# Patient Record
Sex: FEMALE | Race: WHITE | NOT HISPANIC OR LATINO | Employment: OTHER | URBAN - METROPOLITAN AREA
[De-identification: names, ages, dates, MRNs, and addresses within clinical notes are randomized per-mention and may not be internally consistent; named-entity substitution may affect disease eponyms.]

---

## 2023-10-20 DIAGNOSIS — G47.61 PERIODIC LIMB MOVEMENT DISORDER: ICD-10-CM

## 2023-10-20 DIAGNOSIS — G47.33 OBSTRUCTIVE SLEEP APNEA SYNDROME: Primary | ICD-10-CM

## 2023-11-13 DIAGNOSIS — G25.81 RESTLESS LEG: ICD-10-CM

## 2023-11-13 DIAGNOSIS — G47.61 PERIODIC LIMB MOVEMENT: ICD-10-CM

## 2023-11-13 DIAGNOSIS — G47.33 OSA (OBSTRUCTIVE SLEEP APNEA): Primary | ICD-10-CM

## 2023-11-15 ENCOUNTER — TELEPHONE (OUTPATIENT)
Dept: PRIMARY CARE | Facility: CLINIC | Age: 73
End: 2023-11-15

## 2023-11-15 NOTE — TELEPHONE ENCOUNTER
----- Message from Sandor Potter MD sent at 11/13/2023  7:46 PM EST -----  1--SENT CPAP ORDER TO [facility] - MAYBE ANOTHER DME SITE CLOSER TO HOME WOULD SUIT PT BETTER;  2--MAKE APPT FOR PT DEC 2023 PLZ 6M FLUP APPT.    ----- Message -----  From: Janet Hernandez  Sent: 11/13/2023   3:31 PM EST  To: Sandor Potter MD    PLEASE CALL PATIENT WITH RESULTS, UPSET SHE HASN'T GOTTEN THEM ,       [facility]

## 2024-01-16 DIAGNOSIS — M79.673 PAIN OF FOOT, UNSPECIFIED LATERALITY: Primary | ICD-10-CM

## 2024-01-16 DIAGNOSIS — E53.8 B12 DEFICIENCY: ICD-10-CM

## 2024-01-16 DIAGNOSIS — R73.9 HYPERGLYCEMIA: ICD-10-CM

## 2024-01-16 DIAGNOSIS — E55.9 VITAMIN D DEFICIENCY: ICD-10-CM

## 2024-01-16 DIAGNOSIS — I10 HYPERTENSION, UNSPECIFIED TYPE: ICD-10-CM

## 2024-01-16 DIAGNOSIS — H91.93 BILATERAL HEARING LOSS, UNSPECIFIED HEARING LOSS TYPE: ICD-10-CM

## 2024-01-16 DIAGNOSIS — R06.83 SNORING: ICD-10-CM

## 2024-01-16 DIAGNOSIS — E78.5 HYPERLIPIDEMIA, UNSPECIFIED HYPERLIPIDEMIA TYPE: ICD-10-CM

## 2024-01-16 DIAGNOSIS — M81.0 SENILE OSTEOPOROSIS: ICD-10-CM

## 2024-01-16 RX ORDER — CELECOXIB 200 MG/1
200 CAPSULE ORAL DAILY
Qty: 60 CAPSULE | Refills: 0 | Status: SHIPPED | OUTPATIENT
Start: 2024-01-16 | End: 2024-03-07 | Stop reason: SDUPTHER

## 2024-01-16 RX ORDER — CELECOXIB 200 MG/1
200 CAPSULE ORAL DAILY
COMMUNITY
End: 2024-01-16 | Stop reason: SDUPTHER

## 2024-03-07 ENCOUNTER — OFFICE VISIT (OUTPATIENT)
Dept: PRIMARY CARE | Facility: CLINIC | Age: 74
End: 2024-03-07
Payer: MEDICARE

## 2024-03-07 VITALS
DIASTOLIC BLOOD PRESSURE: 72 MMHG | BODY MASS INDEX: 33.13 KG/M2 | HEART RATE: 84 BPM | TEMPERATURE: 97.2 F | WEIGHT: 180 LBS | HEIGHT: 62 IN | SYSTOLIC BLOOD PRESSURE: 120 MMHG

## 2024-03-07 DIAGNOSIS — G47.33 OSA (OBSTRUCTIVE SLEEP APNEA): ICD-10-CM

## 2024-03-07 DIAGNOSIS — R73.9 HYPERGLYCEMIA: ICD-10-CM

## 2024-03-07 DIAGNOSIS — Z78.0 MENOPAUSE: ICD-10-CM

## 2024-03-07 DIAGNOSIS — M15.9 GENERALIZED OSTEOARTHROSIS, INVOLVING MULTIPLE SITES: ICD-10-CM

## 2024-03-07 DIAGNOSIS — E66.09 CLASS 1 OBESITY DUE TO EXCESS CALORIES WITH SERIOUS COMORBIDITY AND BODY MASS INDEX (BMI) OF 32.0 TO 32.9 IN ADULT: ICD-10-CM

## 2024-03-07 DIAGNOSIS — H91.93 BILATERAL DEAFNESS: ICD-10-CM

## 2024-03-07 DIAGNOSIS — I10 HYPERTENSION, UNSPECIFIED TYPE: Primary | ICD-10-CM

## 2024-03-07 DIAGNOSIS — F43.23 ADJUSTMENT DISORDER WITH MIXED ANXIETY AND DEPRESSED MOOD: ICD-10-CM

## 2024-03-07 DIAGNOSIS — G47.61 PERIODIC LIMB MOVEMENT DISORDER: ICD-10-CM

## 2024-03-07 DIAGNOSIS — Z12.11 COLON CANCER SCREENING: ICD-10-CM

## 2024-03-07 DIAGNOSIS — Z11.59 ENCOUNTER FOR HEPATITIS C SCREENING TEST FOR LOW RISK PATIENT: ICD-10-CM

## 2024-03-07 DIAGNOSIS — M79.673 PAIN OF FOOT, UNSPECIFIED LATERALITY: ICD-10-CM

## 2024-03-07 DIAGNOSIS — G45.9 TRANSIENT ISCHEMIC ATTACK (TIA): ICD-10-CM

## 2024-03-07 DIAGNOSIS — R29.890 LOSS OF HEIGHT: ICD-10-CM

## 2024-03-07 DIAGNOSIS — E78.5 HYPERLIPIDEMIA, UNSPECIFIED HYPERLIPIDEMIA TYPE: ICD-10-CM

## 2024-03-07 DIAGNOSIS — E55.9 VITAMIN D DEFICIENCY: ICD-10-CM

## 2024-03-07 PROCEDURE — 3078F DIAST BP <80 MM HG: CPT | Performed by: FAMILY MEDICINE

## 2024-03-07 PROCEDURE — 1160F RVW MEDS BY RX/DR IN RCRD: CPT | Performed by: FAMILY MEDICINE

## 2024-03-07 PROCEDURE — 3074F SYST BP LT 130 MM HG: CPT | Performed by: FAMILY MEDICINE

## 2024-03-07 PROCEDURE — 1036F TOBACCO NON-USER: CPT | Performed by: FAMILY MEDICINE

## 2024-03-07 PROCEDURE — 99214 OFFICE O/P EST MOD 30 MIN: CPT | Performed by: FAMILY MEDICINE

## 2024-03-07 PROCEDURE — 1159F MED LIST DOCD IN RCRD: CPT | Performed by: FAMILY MEDICINE

## 2024-03-07 RX ORDER — CHOLECALCIFEROL (VITAMIN D3) 125 MCG
125 CAPSULE ORAL DAILY
Qty: 90 CAPSULE | Refills: 3 | Status: SHIPPED | OUTPATIENT
Start: 2024-03-07 | End: 2025-03-07

## 2024-03-07 RX ORDER — HYDROCHLOROTHIAZIDE 12.5 MG/1
12.5 CAPSULE ORAL DAILY
Qty: 90 CAPSULE | Refills: 3 | Status: SHIPPED | OUTPATIENT
Start: 2024-03-07 | End: 2025-03-07

## 2024-03-07 RX ORDER — AMLODIPINE BESYLATE 5 MG/1
5 TABLET ORAL DAILY
Qty: 90 TABLET | Refills: 3 | Status: SHIPPED | OUTPATIENT
Start: 2024-03-07 | End: 2025-03-07

## 2024-03-07 RX ORDER — CHOLECALCIFEROL (VITAMIN D3) 125 MCG
CAPSULE ORAL
COMMUNITY
End: 2024-03-07 | Stop reason: SDUPTHER

## 2024-03-07 RX ORDER — CELECOXIB 200 MG/1
200 CAPSULE ORAL DAILY
Qty: 60 CAPSULE | Refills: 0 | Status: SHIPPED | OUTPATIENT
Start: 2024-03-07 | End: 2024-05-07 | Stop reason: SDUPTHER

## 2024-03-07 RX ORDER — VENLAFAXINE 75 MG/1
75 TABLET ORAL 3 TIMES DAILY
COMMUNITY
End: 2024-03-07 | Stop reason: SDUPTHER

## 2024-03-07 RX ORDER — CALCIUM CARBONATE 600 MG
600 TABLET ORAL DAILY
COMMUNITY
End: 2024-03-07 | Stop reason: SDUPTHER

## 2024-03-07 RX ORDER — HYDROCHLOROTHIAZIDE 12.5 MG/1
12.5 CAPSULE ORAL DAILY
COMMUNITY
End: 2024-03-07 | Stop reason: SDUPTHER

## 2024-03-07 RX ORDER — ATORVASTATIN CALCIUM 80 MG/1
80 TABLET, FILM COATED ORAL DAILY
COMMUNITY
End: 2024-03-07 | Stop reason: SDUPTHER

## 2024-03-07 RX ORDER — VENLAFAXINE 75 MG/1
75 TABLET ORAL 3 TIMES DAILY
Qty: 270 TABLET | Refills: 3 | Status: SHIPPED | OUTPATIENT
Start: 2024-03-07 | End: 2025-03-07

## 2024-03-07 RX ORDER — AMLODIPINE BESYLATE 5 MG/1
5 TABLET ORAL DAILY
COMMUNITY
End: 2024-03-07 | Stop reason: SDUPTHER

## 2024-03-07 RX ORDER — ATORVASTATIN CALCIUM 80 MG/1
80 TABLET, FILM COATED ORAL DAILY
Qty: 90 TABLET | Refills: 3 | Status: SHIPPED | OUTPATIENT
Start: 2024-03-07 | End: 2025-03-07

## 2024-03-07 RX ORDER — CALCIUM CARBONATE 600 MG
1500 TABLET ORAL DAILY
Qty: 90 TABLET | Refills: 3 | Status: SHIPPED | OUTPATIENT
Start: 2024-03-07 | End: 2025-03-07

## 2024-03-07 ASSESSMENT — ENCOUNTER SYMPTOMS
LOSS OF SENSATION IN FEET: 0
OCCASIONAL FEELINGS OF UNSTEADINESS: 0
DEPRESSION: 0

## 2024-03-07 ASSESSMENT — PATIENT HEALTH QUESTIONNAIRE - PHQ9
2. FEELING DOWN, DEPRESSED OR HOPELESS: NOT AT ALL
SUM OF ALL RESPONSES TO PHQ9 QUESTIONS 1 AND 2: 0
1. LITTLE INTEREST OR PLEASURE IN DOING THINGS: NOT AT ALL

## 2024-03-07 NOTE — PROGRESS NOTES
Subjective   Patient ID: Britany Milton is a 73 y.o. female who presents for Follow-up (6 MONTH CHECK UP ).  HPI  6 M FLUP FROM OCTOBER   NO ONE CALLED ME FOR THIS FLUP APPT.    NO ONE CALLED ME FOR SLEEP STUDY.  REC MYCHART.   NEEDS REFILLS         Review of Systems   All other systems reviewed and are negative.      Objective   Physical Exam  Vitals and nursing note reviewed.   Constitutional:       Appearance: Normal appearance.   HENT:      Head: Normocephalic.      Right Ear: Tympanic membrane, ear canal and external ear normal.      Left Ear: Tympanic membrane, ear canal and external ear normal.      Nose: Nose normal.      Mouth/Throat:      Mouth: Mucous membranes are moist.      Pharynx: Oropharynx is clear.   Eyes:      Conjunctiva/sclera: Conjunctivae normal.      Pupils: Pupils are equal, round, and reactive to light.   Cardiovascular:      Rate and Rhythm: Normal rate and regular rhythm.      Pulses: Normal pulses.      Heart sounds: Normal heart sounds.   Pulmonary:      Effort: Pulmonary effort is normal.      Breath sounds: Normal breath sounds.   Abdominal:      General: Bowel sounds are normal.      Palpations: Abdomen is soft.   Musculoskeletal:         General: Normal range of motion.      Cervical back: Normal range of motion and neck supple.   Skin:     General: Skin is warm and dry.   Neurological:      General: No focal deficit present.      Mental Status: She is oriented to person, place, and time. Mental status is at baseline.   Psychiatric:         Mood and Affect: Mood normal.         Behavior: Behavior normal.         Assessment/Plan   Diagnoses and all orders for this visit:  Hypertension, unspecified type  -     hydroCHLOROthiazide (Microzide) 12.5 mg capsule; Take 1 capsule (12.5 mg) by mouth once daily.  -     Follow Up In Primary Care - Medicare Annual; Future  -     amLODIPine (Norvasc) 5 mg tablet; Take 1 tablet (5 mg) by mouth once daily.  -     Urinalysis with Reflex  Microscopic; Future  -     TSH with reflex to Free T4 if abnormal; Future  -     Comprehensive Metabolic Panel; Future  -     CBC and Auto Differential; Future  Pain of foot, unspecified laterality  -     celecoxib (CeleBREX) 200 mg capsule; Take 1 capsule (200 mg) by mouth once daily.  -     Uric Acid; Future  Hyperlipidemia, unspecified hyperlipidemia type  -     atorvastatin (Lipitor) 80 mg tablet; Take 1 tablet (80 mg) by mouth once daily.  -     Lipid panel; Future  -     Urinalysis with Reflex Microscopic; Future  -     TSH with reflex to Free T4 if abnormal; Future  -     Comprehensive Metabolic Panel; Future  -     CBC and Auto Differential; Future  Hyperglycemia  -     Hemoglobin A1c; Future  -     Urinalysis with Reflex Microscopic; Future  -     Comprehensive Metabolic Panel; Future  -     CBC and Auto Differential; Future  Vitamin D deficiency  -     calcium carbonate 600 mg calcium (1,500 mg) tablet; Take 1 tablet (1,500 mg) by mouth once daily.  -     cholecalciferol (Vitamin D-3) 125 MCG (5000 UT) capsule; Take 1 capsule (125 mcg) by mouth once daily.  -     Vitamin D 25-Hydroxy,Total (for eval of Vitamin D levels); Future  Bilateral deafness  Loss of height  -     calcium carbonate 600 mg calcium (1,500 mg) tablet; Take 1 tablet (1,500 mg) by mouth once daily.  -     cholecalciferol (Vitamin D-3) 125 MCG (5000 UT) capsule; Take 1 capsule (125 mcg) by mouth once daily.  -     Vitamin D 25-Hydroxy,Total (for eval of Vitamin D levels); Future  Generalized osteoarthrosis, involving multiple sites  Transient ischemic attack (TIA)  -     Referral to Neurology; Future  Adjustment disorder with mixed anxiety and depressed mood  -     venlafaxine (Effexor) 75 mg tablet; Take 1 tablet (75 mg) by mouth 3 times a day.  Class 1 obesity due to excess calories with serious comorbidity and body mass index (BMI) of 32.0 to 32.9 in adult  Colon cancer screening  -     Cologuard® colon cancer screening;  Future  Encounter for hepatitis C screening test for low risk patient  -     Hepatitis C antibody; Future  Menopause  -     XR DEXA bone density; Future  GEOFF (obstructive sleep apnea)  -     Positive Airway Pressure (PAP) Therapy  -     Referral to Neurology; Future  -     Referral to Neurology; Future  Periodic limb movement disorder  -     Positive Airway Pressure (PAP) Therapy  -     Referral to Neurology; Future  -     Referral to Neurology; Future         From [facility]:  Date: 23   HISTORY SUMMARY - [facility] INTERNAL MEDICINE SPEC       Name:  Britany Milton                          Acct#: [id number]    Sex: F  : 1950  Age: 73 years           PMH:  Immun/Inj. Record:  91301-Covid 19 Moderna Sars-Cov-2vac mRNA, LNP-S, PF, 100 mcg/ 0.5 mL 10/05/22 04/28/22 11/05/21 03/02/21 02/02/21  90750-Shingrix (Shingles) Vaccine Recombinant  [address]-12 19  91309-Vksohrl82 Pneumococcal conjugate vaccine, 20 valent (PCV20) 23  90670-Prevnar 13  [address]-07 11/22/16  97434-Jnslcpz High Dose  08619-217-29 13  Health Maintenance:  Bone Density Test - (2023) : NL DEXA-   ; 21:    [facility] HIP AND SPINE... 2014 OSTEOPENIA    2021  FEMORAL NECK -2.0  BACK GOOD      Colonoscopy - (2019)  2019  dr saeed   diverticulosis,   recheck 10 years:       Mammogram - (2023) : nl mammo retest :   nl mammo retest ;   2013 NEG.    2014 NEG.      2015 NEG.   2016 NEG.   2017 NEG.   2018  NEG.     2020 NEG.   2021 NEG.   2022 NEG.  Tdap - DUE FOR ONE  Pneum     Alt 58   Vit D 22   Vit  B12 250 - (2013) CHOL 235 HDL 73    LDL-P 1982    VIT B12 250  Chol 211 HDL 76   LDL-P 1847 - (2013)   VIT B12 424   VIT D 38  Chol 203  HDL 70 LDL 89 - (2014) AST 34  ( NL TO 31).   VIT B12 364   VIT D 19  Flu Shot -   Chol 195  HDL 68 LDL 76 - (2014) VIT D 27   ALK PHOS 170   AST 32 ( nl to 31)  Chol 190    HDL 66  LDL 71 -  (2016)   VIT B12  275    VIT D 24   ALK PHOS 175  Chol 237    HDL 62   - (2016)   ALK PHOS 175     VIT D 29     VIT B12  911  Pneumovax  Age 63 Covid Inj -     HgbA1C Screening - (2022) : 5.6% AIC  Prevnar - [id number] MAY 2023  Shingrix - (2020) #2 2020  Negative For Pap - (3/2018) EVERY TWO YEARS    2014 NEG.     3/2018  NEG.     Medical Problems:  Arthritis  Degenerative Joint Disease -   OSTEOARTHRITIS.  AT ONE TIME THOUGHT TO HAVE RHEUMATOID  ARTHRITS PER DR EVANGELISTA AND SHE WAS ON MTX.  THEN SAW DR CIFUENTES AND HE TOLD HER SHE DID NOT HAVE RA.  NO SHE DOES NOT HAVE RHEUMATOID ARTHRITIS  Hypercholesterolemia, Chronic Airway Disease  Anxiety -   CHRONIC AND CONTROLLED WITH VENLAFAXINE  Hypertriglyceridemia, Personal History Of Colonic Polyps, Anti-Nuclear Antibody Positive  TIA    Slurred Speech,  Incoordination - (2016)   RESOLVED IN 24 HOURS.  PUT ON ASA.  DR CHAVARRIA SAW IN THE HOSPITAL  Admission  For TIA - (2016) [facility]  Surgical Hx:  Ovarian Cyst/Removal Of Ovar And Tubes Age 23, Breast Surgery  Total Knee Replacement - () LEFT,  DR. ARRIAZA  Right Knee Replacement  DR Kepley - (2015)  Bilateral Cataract Removal - (2017) DR RASHID           FH:  Father: due to Congestive Heart Failure - AGE 62,  SMOKER.  Mother: due to Uterine Cancer - AGE 56,   FROM POLYARTERITIS NODOSA.  Son 1:  Unremarkable.  Daughter 1:  Unremarkable.  Siblings:2.  Sister 1:  Identical Twin With ITP And MS And Retinitis Pigmentosa -    AGE 67    LEUKEMIA.  Sister 2:  Multiple Sclerosis (MS).  Paternal Grandfather: due to Heart Disease.  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[employer].Diet: Healthy, Well Balanced.Sleep: Typically sleeps 8 hours a night.Spiritual  Orientation: Kellee.Hand Dominance: Right-handed.Hobbies: Sewing, Cooking, Reading.SHE HAS THE LIVING WILL  Personal Habits:  Cigarette Use: Never Smoked Cigarettes.Alcohol: Drinks 2 Alcoholic Beverages Per Day - 2 GLASSES DAILY 2016 2022  2 AND 1/2 GLASSES EVERY NIGHT.Drug Use: Never Used Drugs.Daily Caffeine: Consumes on average 1 cup of coffee per day.Exercise Type: Exercises regularly.Exercise Limitations: At This Time, Just Doing Water Exercises Due To Pain Right Knee.Seat Belt Car: always uses seat belt.      .VS

## 2024-03-22 LAB — NONINV COLON CA DNA+OCC BLD SCRN STL QL: NEGATIVE

## 2024-05-07 DIAGNOSIS — M79.673 PAIN OF FOOT, UNSPECIFIED LATERALITY: ICD-10-CM

## 2024-05-07 RX ORDER — CELECOXIB 200 MG/1
200 CAPSULE ORAL DAILY
Qty: 60 CAPSULE | Refills: 0 | Status: SHIPPED | OUTPATIENT
Start: 2024-05-07 | End: 2024-07-06

## 2024-07-23 DIAGNOSIS — M79.673 PAIN OF FOOT, UNSPECIFIED LATERALITY: ICD-10-CM

## 2024-07-23 RX ORDER — CELECOXIB 200 MG/1
200 CAPSULE ORAL DAILY
Qty: 60 CAPSULE | Refills: 0 | Status: SHIPPED | OUTPATIENT
Start: 2024-07-23

## 2024-09-04 DIAGNOSIS — I10 HYPERTENSION, UNSPECIFIED TYPE: ICD-10-CM

## 2024-09-04 DIAGNOSIS — M79.673 PAIN OF FOOT, UNSPECIFIED LATERALITY: ICD-10-CM

## 2024-09-04 RX ORDER — AMLODIPINE BESYLATE 5 MG/1
5 TABLET ORAL DAILY
Qty: 90 TABLET | Refills: 0 | Status: SHIPPED | OUTPATIENT
Start: 2024-09-04

## 2024-09-04 RX ORDER — AMLODIPINE BESYLATE 5 MG/1
5 TABLET ORAL DAILY
Qty: 90 TABLET | Refills: 3 | Status: SHIPPED | OUTPATIENT
Start: 2024-09-04 | End: 2024-09-04 | Stop reason: SDUPTHER

## 2024-09-04 RX ORDER — CELECOXIB 200 MG/1
200 CAPSULE ORAL DAILY
Qty: 60 CAPSULE | Refills: 0 | Status: SHIPPED | OUTPATIENT
Start: 2024-09-04

## 2024-09-09 ENCOUNTER — APPOINTMENT (OUTPATIENT)
Dept: PRIMARY CARE | Facility: CLINIC | Age: 74
End: 2024-09-09
Payer: MEDICARE